# Patient Record
Sex: FEMALE | Race: ASIAN | Employment: FULL TIME | URBAN - METROPOLITAN AREA
[De-identification: names, ages, dates, MRNs, and addresses within clinical notes are randomized per-mention and may not be internally consistent; named-entity substitution may affect disease eponyms.]

---

## 2019-06-13 ENCOUNTER — TRANSFERRED RECORDS (OUTPATIENT)
Dept: HEALTH INFORMATION MANAGEMENT | Facility: CLINIC | Age: 34
End: 2019-06-13

## 2021-07-28 ENCOUNTER — MEDICAL CORRESPONDENCE (OUTPATIENT)
Dept: HEALTH INFORMATION MANAGEMENT | Facility: CLINIC | Age: 36
End: 2021-07-28

## 2021-07-28 ENCOUNTER — TRANSFERRED RECORDS (OUTPATIENT)
Dept: HEALTH INFORMATION MANAGEMENT | Facility: CLINIC | Age: 36
End: 2021-07-28

## 2021-08-11 ENCOUNTER — TELEPHONE (OUTPATIENT)
Dept: GASTROENTEROLOGY | Facility: CLINIC | Age: 36
End: 2021-08-11

## 2021-08-11 ENCOUNTER — TELEPHONE (OUTPATIENT)
Dept: GASTROENTEROLOGY | Facility: OUTPATIENT CENTER | Age: 36
End: 2021-08-11

## 2021-08-11 DIAGNOSIS — R10.84 ABDOMINAL PAIN, GENERALIZED: Primary | ICD-10-CM

## 2021-08-11 NOTE — TELEPHONE ENCOUNTER
Advanced Endoscopy     Referring provider:  Cynthia Robertson    Referred to: [facility]     Provider Requested:  Dr. Richter     Referral Received: 08/11/21    Records received: 08/11/21     Images received: none    Evaluation for: EUS + ERCP w/papillotomy     Clinical History (per RN review):     Hx of pancreatitis requiring hospitalization. After this episode had 3 more episodes of severe abd pain with transient elevations of amylase and lipase. Pt reporting increasing frequency of sharp epigastric pain, radiating to her back, without elevations in LFTS. Pt does not have known gallstones, no ETOH abuse and known family history of pancreatitis issues. Pt continues to have looser stools, like steatorrhea and iron deficiency anemia, for which patient received iron infusion.     On creon, 36,000 units 2-4 caps 3-4x daily    Pt reporting pancreatitis admission in 2015 (notes below) Had similar pain in 2016 (CT, EGD/colonoscopy done, lipase minimally elevated @ 461) Happened again after the birth of her second child in 2019.  Now w/ continued pain, lower mid abdomen.     PCP is [facility] in [place], [place]    DATE OF ADMISSION: 12/13/2015  DATE OF DISCHARGE: 12/17/2015    DISCHARGE DIAGNOSES:  1. Acute pancreatitis.  2. Low back pain thought secondary to acute pancreatitis.  3. Recent history of bloody colitis.   4. History of vaginal delivery at 38-3/7th weeks on April 17, 2015.     EGD/Colonoscopy done in 2016  Narrative/Findings Report: Patient was taken to the endoscopy suite where risks and complications were explained to the patient who voiced understanding and wished to proceed. Anesthesia was found to be adequate. Scope was inserted through the oropharynx down the esophagus through the stomach into the first, second and third part of the small bowel. Small bowel was noted to be normal. Random biopsy was done for histology. Withdrawal into the stomach revealed no inflammation. Random biopsy  was done for H pylori and histology. Withdrawal up the EG junction revealed mild reflux changes in lower 3 cm. A biopsy of this was done. The rest of the esophagus was unremarkable.    Attention was turned to lower end. Rectal exam was done revealing no abnormalities. Colonoscope was inserted and driven length of the colon to the ileocical valve. Appendiceal lumen was photographed for identification purposes. Upon careful withdrawal there were no polyps, strictures, masses,or diverticuli noted. She did have very mild colitis changes noted to distal 50 cm of sigmoid colon, random biopsies were done. Retroflexion in the rectum showed no abnormalities. Air was removed, scope was removed and patient was taken to the recovery room in good condition.      MD review date:   MD Decision for clinic consultation/Orders:            Referral updates/Patient contacted: 8/11/21, left message for patient. 8/12: per patient was in [place], [place] - episodes in 2016, 2019. Having abd pain now, but lipase levels are not up. Pain below bellybutton, middle.

## 2021-08-23 NOTE — TELEPHONE ENCOUNTER
[facility]    Called to discuss with patient, scheduling number provided    ML

## 2021-08-24 NOTE — TELEPHONE ENCOUNTER
Per patient, requesting oral sedation prior to sMRCP scan. Will order valium per protocol the week of planned MRI, scheduled 9/27, to [facility] pharmacy as agreed.    ML

## 2021-09-22 RX ORDER — DIAZEPAM 5 MG
TABLET ORAL
Qty: 2 TABLET | Refills: 0 | Status: SHIPPED | OUTPATIENT
Start: 2021-09-22

## 2021-09-27 ENCOUNTER — ANCILLARY PROCEDURE (OUTPATIENT)
Dept: MRI IMAGING | Facility: CLINIC | Age: 36
End: 2021-09-27
Attending: INTERNAL MEDICINE
Payer: COMMERCIAL

## 2021-09-27 VITALS — WEIGHT: 118 LBS

## 2021-09-27 DIAGNOSIS — R10.84 ABDOMINAL PAIN, GENERALIZED: ICD-10-CM

## 2021-09-27 PROCEDURE — 74183 MRI ABD W/O CNTR FLWD CNTR: CPT | Mod: GC | Performed by: RADIOLOGY

## 2021-09-27 PROCEDURE — A9585 GADOBUTROL INJECTION: HCPCS | Performed by: RADIOLOGY

## 2021-09-27 RX ORDER — GADOBUTROL 604.72 MG/ML
7.5 INJECTION INTRAVENOUS ONCE
Status: COMPLETED | OUTPATIENT
Start: 2021-09-27 | End: 2021-09-27

## 2021-09-27 RX ADMIN — GADOBUTROL 6.5 ML: 604.72 INJECTION INTRAVENOUS at 11:06

## 2021-10-06 ENCOUNTER — VIRTUAL VISIT (OUTPATIENT)
Dept: GASTROENTEROLOGY | Facility: CLINIC | Age: 36
End: 2021-10-06
Payer: COMMERCIAL

## 2021-10-06 VITALS — WEIGHT: 118 LBS | BODY MASS INDEX: 20.14 KG/M2 | HEIGHT: 64 IN

## 2021-10-06 DIAGNOSIS — K85.00 IDIOPATHIC ACUTE PANCREATITIS WITHOUT INFECTION OR NECROSIS: Primary | ICD-10-CM

## 2021-10-06 PROCEDURE — 99205 OFFICE O/P NEW HI 60 MIN: CPT | Mod: GT | Performed by: INTERNAL MEDICINE

## 2021-10-06 ASSESSMENT — MIFFLIN-ST. JEOR: SCORE: 1210.24

## 2021-10-06 NOTE — LETTER
"    10/6/2021         RE: Chely Chiu  [address]  [facility] [address]        Dear Colleague,    Thank you for referring your patient, Chely Chiu, to the [facility] [place]. Please see a copy of my visit note below.    ADVANCED ENDOSCOPY / PANCREATICOBILIARY CLINIC VISIT    CC/REFERRING MD:  Cynthia Robertson  REASON FOR CONSULTATION:   Cynthia Robertson for   Chief Complaint   Patient presents with     New Patient     new consult   Start: 10/06/2021 10:58 am  Stop: 10/06/2021 11:58 am    ASSESSMENT/PLAN:  Ms. Chiu is a 37yo female w/ PMHx Iron Deficiency Anemia who is seen virtually today as a new referral regarding recurrent acute Pancreatitis.     Pancreatitis history and timeline as per below; coincidentally, first episode occurred after an episode of what sounds like infectious colitis / traveler's diarrhea following a trip to [place]. No history of EtOH use. No history of gallstones, no history of LFT elevation or hyperbili. No family history of Pancreatitis. Prior abdominal imaging without evidence of significant anatomic abnormalities or masses. Most recent MRCP imaging within the past month overall reassuring with widely patent biliary system and no significant pancreatic abnormalities noted. Will ultimately need to further evaluate for subtle biliary pathology not previously visualized. Would also consider metabolic causes (such as hypertriglyceridemia) vs genetic predispositions towards Pancreatitis.    Does also have history of loose, \"hard to flush\" stools for the past 2-3 years in the setting of recurrent acute Pancreatitis and possible infectious colitis. Overall mild without significant associated abdominal pain, not particularly intrusive to life. Currently on pancreatic enzyme supplementation. Differential concerning for post-infectious IBS vs Exocrine Pancreatic Insufficiency vs IBD. Prior Celiac studies reassuring. ROSA MARIA not " "elevated.    Recommendations:  - Would perform EUS to evaluate for more subtle biliary pathology, can be performed locally in [place]  - Would obtain Fecal Elastase, can also be performed locally  - Will need to obtain outside colonoscopy + biopsy records from [place] in 2019    Pt seen with and discussed with attending GI staff, Dr. Richter.    Brian Gonzalez MD  Internal Medicine Resident, PGY-1    ===================================    HPI  Ms. Chiu is a 35yo female w/ PMHx iron deficient anemia and recurrent pancreatitis who is seen via virtual visit this morning for continued concerns for bouts of pancreatitis.    Her first episode dates back to Dec 2015 or so. Notes she was traveling back from a trip to [place] when she had a sudden onset of abdominal pain, nausea, and vomiting. This overall resolved after a few days; however, was followed by an acute onset of severe mid back and epigastric pain with recurrent nausea, vomiting. At that time [place] [address]. CT and MRI at that time showing possible mild pancreatic head enlargement (~3.4cm) though no overt mass. RUQ US did not show stones or notable duct dilation. Treated as an inpatient at that time. Had repeat CT imaging in 2016, at that time showing interval improvement in pancreatic head enlargement (down to 2.6cm), no masses, no evidence of acute inflammation.     Next recurrence of symptoms was in 2019 following birth of her second child. Lipase at that time without significant elevation at 162. Did not require admission at that time. Has had two distinct episodes of sharp, acute mid back pain since that time over the course of the past year; however, both self-limiting and very mild in nature (occurred overnight one night and resolved by morning). Has not sought care for these episodes.    Since that time, has also had trouble with intermittent loose stools, not described as \"oily\" though notes she has trouble flushing " this down. Has been prescribed Creon 36,000 units 2-4 caps 3-4x daily in consideration of possible steatorrhea with pancreas history.     She has no personal history of EtOH use. Non-smoker. No known history of gallstones nor imaging findings concerning for stones. No family history of pancreatitis. Reports she has never been told she has high cholesterol or high triglycerides.    Of note, had endoscopy/colonoscopy performed in 2016. Showed some very mild colitis changes to the sigmoid colon, biopsy at that time showing patchy, significant inflammation as well as a single possible granuloma. At that time, concern was for IBD vs subacute infectious colitis. Has had repeat scope in 2019 with biopsies taken, records not available this visit.    ROS:    10 point ROS neg other than the symptoms noted above in the HPI.    PREVIOUS ENDOSCOPY:  EGD/Colonoscopy done in 2016  Narrative/Findings Report: Patient was taken to the endoscopy suite where risks and complications were explained to the patient who voiced understanding and wished to proceed. Anesthesia was found to be adequate. Scope was inserted through the oropharynx down the esophagus through the stomach into the first, second and third part of the small bowel. Small bowel was noted to be normal. Random biopsy was done for histology. Withdrawal into the stomach revealed no inflammation. Random biopsy was done for H pylori and histology. Withdrawal up the EG junction revealed mild reflux changes in lower 3 cm. A biopsy of this was done. The rest of the esophagus was unremarkable.    Attention was turned to lower end. Rectal exam was done revealing no abnormalities. Colonoscope was inserted and driven length of the colon to the ileocical valve. Appendiceal lumen was photographed for identification purposes. Upon careful withdrawal there were no polyps, strictures, masses,or diverticuli noted. She did have very mild colitis changes noted to distal 50 cm of sigmoid  colon, random biopsies were done. Retroflexion in the rectum showed no abnormalities. Air was removed, scope was removed and patient was taken to the recovery room in good condition.    Path from 2016 scope:   D.   Random colon, biopsies:   -    Patchy, moderate acute inflammation along with some features   suggestive of chronicity (see comment).   -    Single poorly formed, possible granuloma.   -    Negative for dysplasia.     COMMENT:   The colon biopsies do exhibit patchy, significant inflammation.   Although not diagnostic, there are some architectural features of   glands as well as deeper, chronic inflammation that raised the   possibility of a chronic inflammatory process including chronic   idiopathic inflammatory bowel disease.  The patchy nature of the   process along with a single possible granuloma also brought to mind   the consideration of Crohn's.  Dr. Valdez of this department also   reviewed the slides and agrees that, although the features are not   diagnostic, there are some features that raise the possibility of   idiopathic inflammatory bowel disease including Crohn's.  The   possibility of a subacute infectious colitis was also considered.   Clinical correlation is suggested.         PERTINENT RELEVANT IMAGING OR LABS:  MRCP 9/27/21:  FINDINGS:  MRCP:   There is no pancreas divisum. The pancreatic duct  does not appear  abnormally dilated, and  no sidebranches are visualized.  Pancreatic duct measures 3 mm prior to administration of secretin.  Following administration of secretin, it dilates to maximum diameter  of 3 mm.  At 10 minutes following secretin administration, the  diameter is 3 mm.     There is normal exocrine function, with contrast seen in the third  portion of the duodenum at 10 minutes following secretin  administration.     Biliary Tree: The cystic, comment and left/right hepatic ducts are  patent without luminal filling defects.     Pancreas: No pancreatic parenchymal  abnormalities. Normal T1 signal of  the pancreas parenchyma.     Liver: No focal hepatic antibodies. No biliary dilatation. Portal  vasculature is patent.     Gallbladder: Nondistended gallbladder. No cholelithiasis. No  pericholecystic fluid.     Spleen: No splenic lesions. Normal enhancement. Normal diffusion  restriction.      Kidneys: Normal cortical medullary enhancement. No abnormal mass.     Adrenal glands: Within normal limits.     Bowel: Normal diameter where visualized.     Lymph nodes: No pathologically enlarged lymph nodes.     Blood vessels: Visualized abdominal aorta and its proximal  bifurcations are widely patent.     Lung bases: No airspace consolidation. Heart size is normal.     Bones and soft tissues: No abnormal osseous or soft tissue lesions.     Mesentery and abdominal wall: Within normal limits.     Ascites: No ascites.                                                                      IMPRESSION:   The biliary system is widely patent without focal lesion or  obstruction.     Normal response to secretin.       ALLERGIES:   No Known Allergies    PERTINENT MEDICATIONS:    Current Outpatient Medications:      Ferrous Sulfate (IRON PO), , Disp: , Rfl:      diazepam (VALIUM) 5 MG tablet, Take 1 30-45 minutes prior to radiology scan.  May repeat x1 prn. (Patient not taking: Reported on 10/6/2021), Disp: 2 tablet, Rfl: 0    PROBLEM LIST  There are no problems to display for this patient.      PERTINENT PAST MEDICAL HISTORY:  No past medical history on file.    PREVIOUS SURGERIES:  No past surgical history on file.    SOCIAL HISTORY:  Social History     Socioeconomic History     Marital status:      Spouse name: Not on file     Number of children: Not on file     Years of education: Not on file     Highest education level: Not on file   Occupational History     Not on file   Tobacco Use     Smoking status: Not on file     Smokeless tobacco: Never Used   Substance and Sexual Activity      Alcohol use: Not on file     Drug use: Not on file     Sexual activity: Not on file   Other Topics Concern     Not on file   Social History Narrative     Not on file     Social Determinants of Health     Financial Resource Strain:      Difficulty of Paying Living Expenses:    Food Insecurity:      Worried About Running Out of Food in the Last Year:      Ran Out of Food in the Last Year:    Transportation Needs:      Lack of Transportation (Medical):      Lack of Transportation (Non-Medical):    Physical Activity:      Days of Exercise per Week:      Minutes of Exercise per Session:    Stress:      Feeling of Stress :    Social Connections:      Frequency of Communication with Friends and Family:      Frequency of Social Gatherings with Friends and Family:      Attends [employer] Services:      Active Member of Clubs or Organizations:      Attends Club or Organization Meetings:      Marital Status:    Intimate Partner Violence:      Fear of Current or Ex-Partner:      Emotionally Abused:      Physically Abused:      Sexually Abused:        FAMILY HISTORY:  No family history on file.    Past/family/social history reviewed and no changes    PHYSICAL EXAMINATION:  Unable to be performed secondary to visit conducted virtually    Seen and examined with GI fellow, agree with findings and recommendations.  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  Recurrent pancreatitis needs EUS to RO biliary tract etiology. No genetic work up at this time.  Is temporally associated w enteric infections from travel to [place], both times, not clear of how that would be related.  Mild, therefore only biliary etiology needs to be ro  Travis Richter MD GI Staff      Again, thank you for allowing me to participate in the care of your patient.      Sincerely,    Travis Richter MD

## 2021-10-06 NOTE — PROGRESS NOTES
Chely is a 36 year old who is being evaluated via a billable video visit.      Please send link to cell phone:  [phone]    How would you like to obtain your AVS? Mail a copy  If the video visit is dropped, the invitation should be resent by: Text to cell phone: [phone]  Will anyone else be joining your video visit? No  See other note for details  Video-Visit Details    Type of service:  Video Visit    Video End Time:3:49 PM    Originating Location (pt. Location): Home    Distant Location (provider location):  [facility] [place]     Platform used for Video Visit: [email or web address]

## 2021-10-06 NOTE — PROGRESS NOTES
"ADVANCED ENDOSCOPY / PANCREATICOBILIARY CLINIC VISIT    CC/REFERRING MD:  Cynthia Robertson  REASON FOR CONSULTATION:   Cynthia Robertson for   Chief Complaint   Patient presents with     New Patient     new consult   Start: 10/06/2021 10:58 am  Stop: 10/06/2021 11:58 am    ASSESSMENT/PLAN:  Ms. Chiu is a 35yo female w/ PMHx Iron Deficiency Anemia who is seen virtually today as a new referral regarding recurrent acute Pancreatitis.     Pancreatitis history and timeline as per below; coincidentally, first episode occurred after an episode of what sounds like infectious colitis / traveler's diarrhea following a trip to [place]. No history of EtOH use. No history of gallstones, no history of LFT elevation or hyperbili. No family history of Pancreatitis. Prior abdominal imaging without evidence of significant anatomic abnormalities or masses. Most recent MRCP imaging within the past month overall reassuring with widely patent biliary system and no significant pancreatic abnormalities noted. Will ultimately need to further evaluate for subtle biliary pathology not previously visualized. Would also consider metabolic causes (such as hypertriglyceridemia) vs genetic predispositions towards Pancreatitis.    Does also have history of loose, \"hard to flush\" stools for the past 2-3 years in the setting of recurrent acute Pancreatitis and possible infectious colitis. Overall mild without significant associated abdominal pain, not particularly intrusive to life. Currently on pancreatic enzyme supplementation. Differential concerning for post-infectious IBS vs Exocrine Pancreatic Insufficiency vs IBD. Prior Celiac studies reassuring. ROSA MARIA not elevated.    Recommendations:  - Would perform EUS to evaluate for more subtle biliary pathology, can be performed locally in [place]  - Would obtain Fecal Elastase, can also be performed locally  - Will need to obtain outside colonoscopy + biopsy records from [place] in 2019    Pt " "seen with and discussed with attending GI staff, Dr. Richter.    Brian Gonzalez MD  Internal Medicine Resident, PGY-1    ===================================    HPI  Ms. Chiu is a 35yo female w/ PMHx iron deficient anemia and recurrent pancreatitis who is seen via virtual visit this morning for continued concerns for bouts of pancreatitis.    Her first episode dates back to Dec 2015 or so. Notes she was traveling back from a trip to [facility] when she had a sudden onset of abdominal pain, nausea, and vomiting. This overall resolved after a few days; however, was followed by an acute onset of severe mid back and epigastric pain with recurrent nausea, vomiting. At that time found [place] [address]. CT and MRI at that time showing possible mild pancreatic head enlargement (~3.4cm) though no overt mass. RUQ US did not show stones or notable duct dilation. Treated as an inpatient at that time. Had repeat CT imaging in 2016, at that time showing interval improvement in pancreatic head enlargement (down to 2.6cm), no masses, no evidence of acute inflammation.     Next recurrence of symptoms was in 2019 following birth of her second child. Lipase at that time without significant elevation at 162. Did not require admission at that time. Has had two distinct episodes of sharp, acute mid back pain since that time over the course of the past year; however, both self-limiting and very mild in nature (occurred overnight one night and resolved by morning). Has not sought care for these episodes.    Since that time, has also had trouble with intermittent loose stools, not described as \"oily\" though notes she has trouble flushing this down. Has been prescribed Creon 36,000 units 2-4 caps 3-4x daily in consideration of possible steatorrhea with pancreas history.     She has no personal history of EtOH use. Non-smoker. No known history of gallstones nor imaging findings concerning for stones. No family history of " pancreatitis. Reports she has never been told she has high cholesterol or high triglycerides.    Of note, had endoscopy/colonoscopy performed in 2016. Showed some very mild colitis changes to the sigmoid colon, biopsy at that time showing patchy, significant inflammation as well as a single possible granuloma. At that time, concern was for IBD vs subacute infectious colitis. Has had repeat scope in 2019 with biopsies taken, records not available this visit.    ROS:    10 point ROS neg other than the symptoms noted above in the HPI.    PREVIOUS ENDOSCOPY:  EGD/Colonoscopy done in 2016  Narrative/Findings Report: Patient was taken to the endoscopy suite where risks and complications were explained to the patient who voiced understanding and wished to proceed. Anesthesia was found to be adequate. Scope was inserted through the oropharynx down the esophagus through the stomach into the first, second and third part of the small bowel. Small bowel was noted to be normal. Random biopsy was done for histology. Withdrawal into the stomach revealed no inflammation. Random biopsy was done for H pylori and histology. Withdrawal up the EG junction revealed mild reflux changes in lower 3 cm. A biopsy of this was done. The rest of the esophagus was unremarkable.    Attention was turned to lower end. Rectal exam was done revealing no abnormalities. Colonoscope was inserted and driven length of the colon to the ileocical valve. Appendiceal lumen was photographed for identification purposes. Upon careful withdrawal there were no polyps, strictures, masses,or diverticuli noted. She did have very mild colitis changes noted to distal 50 cm of sigmoid colon, random biopsies were done. Retroflexion in the rectum showed no abnormalities. Air was removed, scope was removed and patient was taken to the recovery room in good condition.    Path from 2016 scope:   D.   Random colon, biopsies:   -    Patchy, moderate acute inflammation along  with some features   suggestive of chronicity (see comment).   -    Single poorly formed, possible granuloma.   -    Negative for dysplasia.     COMMENT:   The colon biopsies do exhibit patchy, significant inflammation.   Although not diagnostic, there are some architectural features of   glands as well as deeper, chronic inflammation that raised the   possibility of a chronic inflammatory process including chronic   idiopathic inflammatory bowel disease.  The patchy nature of the   process along with a single possible granuloma also brought to mind   the consideration of Crohn's.  Dr. Valdez of this department also   reviewed the slides and agrees that, although the features are not   diagnostic, there are some features that raise the possibility of   idiopathic inflammatory bowel disease including Crohn's.  The   possibility of a subacute infectious colitis was also considered.   Clinical correlation is suggested.         PERTINENT RELEVANT IMAGING OR LABS:  MRCP 9/27/21:  FINDINGS:  MRCP:   There is no pancreas divisum. The pancreatic duct  does not appear  abnormally dilated, and  no sidebranches are visualized.  Pancreatic duct measures 3 mm prior to administration of secretin.  Following administration of secretin, it dilates to maximum diameter  of 3 mm.  At 10 minutes following secretin administration, the  diameter is 3 mm.     There is normal exocrine function, with contrast seen in the third  portion of the duodenum at 10 minutes following secretin  administration.     Biliary Tree: The cystic, comment and left/right hepatic ducts are  patent without luminal filling defects.     Pancreas: No pancreatic parenchymal abnormalities. Normal T1 signal of  the pancreas parenchyma.     Liver: No focal hepatic antibodies. No biliary dilatation. Portal  vasculature is patent.     Gallbladder: Nondistended gallbladder. No cholelithiasis. No  pericholecystic fluid.     Spleen: No splenic lesions. Normal enhancement.  Normal diffusion  restriction.      Kidneys: Normal cortical medullary enhancement. No abnormal mass.     Adrenal glands: Within normal limits.     Bowel: Normal diameter where visualized.     Lymph nodes: No pathologically enlarged lymph nodes.     Blood vessels: Visualized abdominal aorta and its proximal  bifurcations are widely patent.     Lung bases: No airspace consolidation. Heart size is normal.     Bones and soft tissues: No abnormal osseous or soft tissue lesions.     Mesentery and abdominal wall: Within normal limits.     Ascites: No ascites.                                                                      IMPRESSION:   The biliary system is widely patent without focal lesion or  obstruction.     Normal response to secretin.       ALLERGIES:   No Known Allergies    PERTINENT MEDICATIONS:    Current Outpatient Medications:      Ferrous Sulfate (IRON PO), , Disp: , Rfl:      diazepam (VALIUM) 5 MG tablet, Take 1 30-45 minutes prior to radiology scan.  May repeat x1 prn. (Patient not taking: Reported on 10/6/2021), Disp: 2 tablet, Rfl: 0    PROBLEM LIST  There are no problems to display for this patient.      PERTINENT PAST MEDICAL HISTORY:  No past medical history on file.    PREVIOUS SURGERIES:  No past surgical history on file.    SOCIAL HISTORY:  Social History     Socioeconomic History     Marital status:      Spouse name: Not on file     Number of children: Not on file     Years of education: Not on file     Highest education level: Not on file   Occupational History     Not on file   Tobacco Use     Smoking status: Not on file     Smokeless tobacco: Never Used   Substance and Sexual Activity     Alcohol use: Not on file     Drug use: Not on file     Sexual activity: Not on file   Other Topics Concern     Not on file   Social History Narrative     Not on file     Social Determinants of Health     Financial Resource Strain:      Difficulty of Paying Living Expenses:    Food Insecurity:       Worried About Running Out of Food in the Last Year:      Ran Out of Food in the Last Year:    Transportation Needs:      Lack of Transportation (Medical):      Lack of Transportation (Non-Medical):    Physical Activity:      Days of Exercise per Week:      Minutes of Exercise per Session:    Stress:      Feeling of Stress :    Social Connections:      Frequency of Communication with Friends and Family:      Frequency of Social Gatherings with Friends and Family:      Attends [employer] Services:      Active Member of Clubs or Organizations:      Attends Club or Organization Meetings:      Marital Status:    Intimate Partner Violence:      Fear of Current or Ex-Partner:      Emotionally Abused:      Physically Abused:      Sexually Abused:        FAMILY HISTORY:  No family history on file.    Past/family/social history reviewed and no changes    PHYSICAL EXAMINATION:  Unable to be performed secondary to visit conducted virtually    Seen and examined with GI fellow, agree with findings and recommendations.  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  Recurrent pancreatitis needs EUS to RO biliary tract etiology. No genetic work up at this time.  Is temporally associated w enteric infections from travel to [place], both times, not clear of how that would be related.  Mild, therefore only biliary etiology needs to be ro  Travis Richter MD GI Staff

## 2021-10-06 NOTE — PATIENT INSTRUCTIONS
Follow up:    Dr. Richter has outlined the following steps after your recent clinic visit:     Recommendations:  - Would perform EUS to evaluate for more subtle biliary pathology, can be performed locally in [place]  - Would obtain Fecal Elastase, can also be performed locally  - Will need to obtain outside colonoscopy + biopsy records from [place] in 2019    Notes and recommendations were faxed to referring provider.    Please call with any questions or concerns regarding your clinic visit today.    It is a pleasure being involved in your health care.    Contacts post-consultation depending on your need:    Schedule Clinic Appointments            [phone] # 1   M-F 7:30 - 5 pm    Kelsie Kirkpatrick RN Care Coordinator  [phone]    Grant Hyman LPN    [phone]     OR Procedure Scheduling                             [phone]    My Chart is available 24 hours a day and is a secure way to access your records and communicate with your care team.  I strongly recommend signing up if you haven't already done so, if you are comfortable with computers.  If you would like to inquire about this or are having problems with My Chart access, you may call [phone] or go online at [email or web address].  Please allow at least 24 hours for a response and extra time on weekends and Holidays.

## 2021-10-17 ENCOUNTER — HEALTH MAINTENANCE LETTER (OUTPATIENT)
Age: 36
End: 2021-10-17

## 2021-10-18 DIAGNOSIS — R10.84 ABDOMINAL PAIN, GENERALIZED: Primary | ICD-10-CM

## 2021-11-08 ENCOUNTER — TRANSFERRED RECORDS (OUTPATIENT)
Dept: HEALTH INFORMATION MANAGEMENT | Facility: CLINIC | Age: 36
End: 2021-11-08
Payer: COMMERCIAL

## 2021-11-18 ENCOUNTER — TRANSFERRED RECORDS (OUTPATIENT)
Dept: HEALTH INFORMATION MANAGEMENT | Facility: CLINIC | Age: 36
End: 2021-11-18
Payer: COMMERCIAL

## 2021-12-02 ENCOUNTER — PATIENT OUTREACH (OUTPATIENT)
Dept: GASTROENTEROLOGY | Facility: CLINIC | Age: 36
End: 2021-12-02
Payer: COMMERCIAL

## 2021-12-02 DIAGNOSIS — K86.89 PANCREATIC INSUFFICIENCY: Primary | ICD-10-CM

## 2021-12-02 NOTE — TELEPHONE ENCOUNTER
Returned call related to follow up with Dr. Richter    Fecal elastase Bhaskar ramsey ordered per protocol, sent to preferred pharmacy.    [facility] [place] [place] [facility] [address] teaching done, clinic scheduled.    ML

## 2022-01-05 ENCOUNTER — VIRTUAL VISIT (OUTPATIENT)
Dept: GASTROENTEROLOGY | Facility: CLINIC | Age: 37
End: 2022-01-05
Payer: COMMERCIAL

## 2022-01-05 DIAGNOSIS — K85.00 IDIOPATHIC ACUTE PANCREATITIS WITHOUT INFECTION OR NECROSIS: Primary | ICD-10-CM

## 2022-01-05 PROCEDURE — 99213 OFFICE O/P EST LOW 20 MIN: CPT | Mod: GT | Performed by: INTERNAL MEDICINE

## 2022-01-05 NOTE — PROGRESS NOTES
Chely is a 36 year old who is being evaluated via a billable video visit.      How would you like to obtain your AVS? MyChart  If the video visit is dropped, the invitation should be resent by: Text to cell phone: [phone]  Will anyone else be joining your video visit? No     Video-Visit Details    Type of service:  Video Visit  Start time 3: 23 PM  Video End Time:3:38 PM    Originating Location (pt. Location): Home    Distant Location (provider location):  [facility] [place]     Platform used for Video Visit: [id number]     Pt follow-up of loose stools and abnormal fecal elastase, plus EUS done locally for idiopathic pancreatitis. Doing well, no pain but still loose stools. Had low elastase 80, but also abnormal fecal calprotectin. (dont have exact numbers). EUS in [place] completely normal, no biliary or pancreatic pathology. Tried Creon but gave her bloating cramps, therefore switched to OTC enzyme supplement, no worse but no better  We spent 15 minutes discussing that 1) EPI unusual but not impossible w normal EUS  And MRCP. Might be false positive. 2) Abnormal calprotectin may be suggestive of inflammatory bowel disease.     REC:  1_ See Dr Spears her GI in [place] re loose stools, ro inflammatory bowel disease  2_ In meantime, increase OTC enzyme until effective or side effects  3_ Eventually might repeat elastase, per Dr Spears   4_ Follow-up w us only if recurrent acute pancreatitis, otherwise best w local GI team.

## 2022-01-05 NOTE — LETTER
1/5/2022         RE: Chely Chiu  [address]  [place] [address]        Dear Colleague,    Thank you for referring your patient, Chely Chiu, to the [facility]. Please see a copy of my visit note below.    Chely is a 36 year old who is being evaluated via a billable video visit.      How would you like to obtain your AVS? MyChart  If the video visit is dropped, the invitation should be resent by: Text to cell phone: [phone]  Will anyone else be joining your video visit? No     Video-Visit Details    Type of service:  Video Visit  Start time 3: 23 PM  Video End Time:3:38 PM    Originating Location (pt. Location): Home    Distant Location (provider location):  [facility]     Platform used for Video Visit: Synchronized     Pt follow-up of loose stools and abnormal fecal elastase, plus EUS done locally for idiopathic pancreatitis. Doing well, no pain but still loose stools. Had low elastase 80, but also abnormal fecal calprotectin. (dont have exact numbers). EUS in [place] completely normal, no biliary or pancreatic pathology. Tried Creon but gave her bloating cramps, therefore switched to OTC enzyme supplement, no worse but no better  We spent 15 minutes discussing that 1) EPI unusual but not impossible w normal EUS  And MRCP. Might be false positive. 2) Abnormal calprotectin may be suggestive of inflammatory bowel disease.     REC:  1_ See Dr Lozano her GI in [place] re loose stools, ro inflammatory bowel disease  2_ In meantime, increase OTC enzyme until effective or side effects  3_ Eventually might repeat elastase, per Dr Lozano   4_ Follow-up w us only if recurrent acute pancreatitis, otherwise best w local GI team.        Again, thank you for allowing me to participate in the care of your patient.        Sincerely,        Travis Richter MD

## 2022-01-05 NOTE — PATIENT INSTRUCTIONS
Follow up:    Dr. Richter has outlined the following steps after your recent clinic visit:    -See Dr Lozano re loose stools, rule out inflammatory bowel disease  -In the meantime, increase OTC enzyme until effective   -Eventually might repeat fecal elastase, per Dr. Lozano   -Follow-up with Dr. Richetr only if you have recurrent acute pancreatitis, otherwise best to follow up with local GI team.      Please call with any questions or concerns regarding your clinic visit today.    It is a pleasure being involved in your health care.    Contacts post-consultation depending on your need:    Schedule Clinic Appointments            [phone] # 1   M-F 7:30 - 5 pm    Kelsie Kirkpatrick RN Care Coordinator  [phone]    Grant Hyman LPN    [phone]     OR Procedure Scheduling                             [phone]    My Chart is available 24 hours a day and is a secure way to access your records and communicate with your care team.  I strongly recommend signing up if you haven't already done so, if you are comfortable with computers.  If you would like to inquire about this or are having problems with My Chart access, you may call [phone] or go online at [email or web address].  Please allow at least 24 hours for a response and extra time on weekends and Holidays.

## 2022-09-12 ENCOUNTER — TELEPHONE (OUTPATIENT)
Dept: URGENT CARE | Age: 37
End: 2022-09-12

## 2022-09-12 ENCOUNTER — WALK IN (OUTPATIENT)
Dept: URGENT CARE | Age: 37
End: 2022-09-12

## 2022-09-12 VITALS
HEART RATE: 97 BPM | TEMPERATURE: 96 F | RESPIRATION RATE: 14 BRPM | SYSTOLIC BLOOD PRESSURE: 96 MMHG | DIASTOLIC BLOOD PRESSURE: 69 MMHG | WEIGHT: 114.64 LBS | OXYGEN SATURATION: 99 %

## 2022-09-12 DIAGNOSIS — R53.83 FATIGUE, UNSPECIFIED TYPE: ICD-10-CM

## 2022-09-12 DIAGNOSIS — R10.84 GENERALIZED ABDOMINAL PAIN: Primary | ICD-10-CM

## 2022-09-12 LAB
ALBUMIN SERPL-MCNC: 3.9 G/DL (ref 3.6–5.1)
ALBUMIN/GLOB SERPL: 0.8 {RATIO} (ref 1–2.4)
ALP SERPL-CCNC: 135 UNITS/L (ref 45–117)
ALT SERPL-CCNC: 41 UNITS/L
ANION GAP SERPL CALC-SCNC: 14 MMOL/L (ref 7–19)
APPEARANCE UR: CLEAR
AST SERPL-CCNC: 27 UNITS/L
BACTERIA #/AREA URNS HPF: ABNORMAL /HPF
BASOPHILS # BLD: 0 K/MCL (ref 0–0.3)
BASOPHILS NFR BLD: 0 %
BILIRUB SERPL-MCNC: 0.3 MG/DL (ref 0.2–1)
BILIRUB UR QL STRIP: NEGATIVE
BUN SERPL-MCNC: 9 MG/DL (ref 6–20)
BUN/CREAT SERPL: 18 (ref 7–25)
CALCIUM SERPL-MCNC: 8.7 MG/DL (ref 8.4–10.2)
CHLORIDE SERPL-SCNC: 102 MMOL/L (ref 97–110)
CO2 SERPL-SCNC: 29 MMOL/L (ref 21–32)
COLOR UR: YELLOW
CREAT SERPL-MCNC: 0.5 MG/DL (ref 0.51–0.95)
DEPRECATED RDW RBC: 38.8 FL (ref 39–50)
EOSINOPHIL # BLD: 0.2 K/MCL (ref 0–0.5)
EOSINOPHIL NFR BLD: 2 %
ERYTHROCYTE [DISTWIDTH] IN BLOOD: 13.5 % (ref 11–15)
FASTING DURATION TIME PATIENT: 0 HOURS (ref 0–999)
GFR SERPLBLD BASED ON 1.73 SQ M-ARVRAT: >90 ML/MIN
GLOBULIN SER-MCNC: 4.6 G/DL (ref 2–4)
GLUCOSE SERPL-MCNC: 105 MG/DL (ref 70–99)
GLUCOSE UR STRIP-MCNC: NEGATIVE MG/DL
HCT VFR BLD CALC: 39.9 % (ref 36–46.5)
HGB BLD-MCNC: 12.3 G/DL (ref 12–15.5)
HGB UR QL STRIP: ABNORMAL
HYALINE CASTS #/AREA URNS LPF: ABNORMAL /LPF
IMM GRANULOCYTES # BLD AUTO: 0 K/MCL (ref 0–0.2)
IMM GRANULOCYTES # BLD: 0 %
KETONES UR STRIP-MCNC: NEGATIVE MG/DL
LEUKOCYTE ESTERASE UR QL STRIP: NEGATIVE
LIPASE SERPL-CCNC: <50 UNITS/L (ref 73–393)
LYMPHOCYTES # BLD: 1.2 K/MCL (ref 1–4.8)
LYMPHOCYTES NFR BLD: 13 %
MCH RBC QN AUTO: 23.9 PG (ref 26–34)
MCHC RBC AUTO-ENTMCNC: 30.8 G/DL (ref 32–36.5)
MCV RBC AUTO: 77.6 FL (ref 78–100)
MONOCYTES # BLD: 0.6 K/MCL (ref 0.3–0.9)
MONOCYTES NFR BLD: 6 %
NEUTROPHILS # BLD: 7.3 K/MCL (ref 1.8–7.7)
NEUTROPHILS NFR BLD: 79 %
NITRITE UR QL STRIP: NEGATIVE
PH UR STRIP: 6 [PH] (ref 5–7)
PLATELET # BLD AUTO: 358 K/MCL (ref 140–450)
POTASSIUM SERPL-SCNC: 3.9 MMOL/L (ref 3.4–5.1)
PROT SERPL-MCNC: 8.5 G/DL (ref 6.4–8.2)
PROT UR STRIP-MCNC: NEGATIVE MG/DL
RBC # BLD: 5.14 MIL/MCL (ref 4–5.2)
RBC #/AREA URNS HPF: ABNORMAL /HPF
SARS-COV-2 RNA RESP QL NAA+PROBE: NOT DETECTED
SERVICE CMNT-IMP: NORMAL
SERVICE CMNT-IMP: NORMAL
SODIUM SERPL-SCNC: 141 MMOL/L (ref 135–145)
SP GR UR STRIP: 1.01 (ref 1–1.03)
SQUAMOUS #/AREA URNS HPF: ABNORMAL /HPF
UROBILINOGEN UR STRIP-MCNC: 0.2 MG/DL
WBC # BLD: 9.2 K/MCL (ref 4.2–11)
WBC #/AREA URNS HPF: ABNORMAL /HPF

## 2022-09-12 PROCEDURE — U0005 INFEC AGEN DETEC AMPLI PROBE: HCPCS | Performed by: INTERNAL MEDICINE

## 2022-09-12 PROCEDURE — 99213 OFFICE O/P EST LOW 20 MIN: CPT | Performed by: FAMILY MEDICINE

## 2022-09-12 PROCEDURE — 85025 COMPLETE CBC W/AUTO DIFF WBC: CPT | Performed by: INTERNAL MEDICINE

## 2022-09-12 PROCEDURE — 83690 ASSAY OF LIPASE: CPT | Performed by: INTERNAL MEDICINE

## 2022-09-12 PROCEDURE — 36415 COLL VENOUS BLD VENIPUNCTURE: CPT | Performed by: INTERNAL MEDICINE

## 2022-09-12 PROCEDURE — 80053 COMPREHEN METABOLIC PANEL: CPT | Performed by: INTERNAL MEDICINE

## 2022-09-12 PROCEDURE — 81001 URINALYSIS AUTO W/SCOPE: CPT | Performed by: INTERNAL MEDICINE

## 2022-09-12 PROCEDURE — U0003 INFECTIOUS AGENT DETECTION BY NUCLEIC ACID (DNA OR RNA); SEVERE ACUTE RESPIRATORY SYNDROME CORONAVIRUS 2 (SARS-COV-2) (CORONAVIRUS DISEASE [COVID-19]), AMPLIFIED PROBE TECHNIQUE, MAKING USE OF HIGH THROUGHPUT TECHNOLOGIES AS DESCRIBED BY CMS-2020-01-R: HCPCS | Performed by: INTERNAL MEDICINE

## 2022-09-26 ENCOUNTER — APPOINTMENT (OUTPATIENT)
Dept: GASTROENTEROLOGY | Age: 37
End: 2022-09-26

## 2022-10-03 ENCOUNTER — HEALTH MAINTENANCE LETTER (OUTPATIENT)
Age: 37
End: 2022-10-03

## 2023-02-11 ENCOUNTER — HEALTH MAINTENANCE LETTER (OUTPATIENT)
Age: 38
End: 2023-02-11

## 2023-07-29 NOTE — TELEPHONE ENCOUNTER
"Referring clinic \"[place] [facility]\" calling to have pt registered to be scheduled for EUS. Account created and message sent to Kelsie Kirkpatrick to inform her of account creation to place EUS order.   "
[facility]  [phone]  
Heterosexual

## 2024-03-09 ENCOUNTER — HEALTH MAINTENANCE LETTER (OUTPATIENT)
Age: 39
End: 2024-03-09